# Patient Record
Sex: MALE | Race: WHITE | URBAN - METROPOLITAN AREA
[De-identification: names, ages, dates, MRNs, and addresses within clinical notes are randomized per-mention and may not be internally consistent; named-entity substitution may affect disease eponyms.]

---

## 2023-10-23 LAB — AMB EXT PSA SCREEN: 1.94 NG/ML

## 2024-06-24 ENCOUNTER — OFFICE VISIT (OUTPATIENT)
Dept: INTERNAL MEDICINE CLINIC | Facility: CLINIC | Age: 69
End: 2024-06-24

## 2024-06-24 VITALS
OXYGEN SATURATION: 97 % | HEART RATE: 66 BPM | TEMPERATURE: 98 F | SYSTOLIC BLOOD PRESSURE: 118 MMHG | HEIGHT: 68.31 IN | DIASTOLIC BLOOD PRESSURE: 64 MMHG | WEIGHT: 202 LBS | BODY MASS INDEX: 30.26 KG/M2

## 2024-06-24 DIAGNOSIS — R00.1 BRADYCARDIA: ICD-10-CM

## 2024-06-24 DIAGNOSIS — I25.10 CORONARY ATHEROSCLEROSIS DUE TO CALCIFIED CORONARY LESION: ICD-10-CM

## 2024-06-24 DIAGNOSIS — C82.93 FOLLICULAR LYMPHOMA OF INTRA-ABDOMINAL LYMPH NODES, UNSPECIFIED FOLLICULAR LYMPHOMA TYPE (HCC): ICD-10-CM

## 2024-06-24 DIAGNOSIS — R00.2 PALPITATIONS: Primary | ICD-10-CM

## 2024-06-24 DIAGNOSIS — I25.84 CORONARY ATHEROSCLEROSIS DUE TO CALCIFIED CORONARY LESION: ICD-10-CM

## 2024-06-24 PROCEDURE — 3008F BODY MASS INDEX DOCD: CPT | Performed by: INTERNAL MEDICINE

## 2024-06-24 PROCEDURE — 1160F RVW MEDS BY RX/DR IN RCRD: CPT | Performed by: INTERNAL MEDICINE

## 2024-06-24 PROCEDURE — 3078F DIAST BP <80 MM HG: CPT | Performed by: INTERNAL MEDICINE

## 2024-06-24 PROCEDURE — 1159F MED LIST DOCD IN RCRD: CPT | Performed by: INTERNAL MEDICINE

## 2024-06-24 PROCEDURE — 3074F SYST BP LT 130 MM HG: CPT | Performed by: INTERNAL MEDICINE

## 2024-06-24 PROCEDURE — 99204 OFFICE O/P NEW MOD 45 MIN: CPT | Performed by: INTERNAL MEDICINE

## 2024-06-24 RX ORDER — MULTIVIT-MIN/IRON/FOLIC ACID/K 18-600-40
CAPSULE ORAL
COMMUNITY

## 2024-06-24 RX ORDER — ATENOLOL 25 MG/1
25 TABLET ORAL DAILY
COMMUNITY

## 2024-06-24 RX ORDER — ASPIRIN 81 MG/1
TABLET, CHEWABLE ORAL DAILY
COMMUNITY

## 2024-06-24 RX ORDER — CHLORAL HYDRATE 500 MG
1000 CAPSULE ORAL DAILY
COMMUNITY

## 2024-06-24 NOTE — PROGRESS NOTES
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  Follows with Dr. Johnson at [facility] is adenopathy is intra-abdominal.  No current symptoms.    He sees Dr. Mcghee cardiology at [facility].  He has been treated for palpitations with atenolol.  He has had stress test in the past currently no symptoms I discussed last CAT scan which showed calcification of his coronary arteries.      Muscle twitching in abd is concerning him.  He is wondering if it could be an aneurysm.  Recent CAT scan showed no evidence of that.  He has no other fasciculation like symptoms.    Sees  DR johnson at  --  [facility] at  [facility]   Patient Active Problem List   Diagnosis    Follicular lymphoma (HCC)    Palpitations    Coronary atherosclerosis due to calcified coronary lesion          Current Outpatient Medications on File Prior to Visit   Medication Sig Dispense Refill    atenolol 25 MG Oral Tab Take 1 tablet (25 mg total) by mouth daily.      Magnesium Cl-Calcium Carbonate 71.5-119 MG Oral Tab EC Take 1 tablet by mouth daily.      aspirin 81 MG Oral Chew Tab Chew by mouth daily.      omega-3 fatty acids 1000 MG Oral Cap Take 1,000 mg by mouth daily.      Cholecalciferol (VITAMIN D) 50 MCG (2000 UT) Oral Cap Take by mouth.      Coenzyme Q10 (Q-10 CO-ENZYME OR) Take by mouth.       No current facility-administered medications on file prior to visit.          Vitals:    06/24/24 1506   BP: 118/64   Pulse: 66   Temp: 98.2 °F (36.8 °C)   VITALSBody mass index is 30.44 kg/m².    Pertinent findings on the physical exam; quivering  of  abd  muscle  wall-he appears very healthy neurological exam normal heart is regular -   did not appreciate significant ectopy.  Lungs are clear.  No hepatosplenomegaly.   Extremities unremarkable no external adenopathy noted.    Duane was seen today for establish care.    Diagnoses and all orders for this visit:    Palpitations    Coronary atherosclerosis due to calcified coronary lesion    Bradycardia    Follicular lymphoma of intra-abdominal lymph nodes, unspecified follicular lymphoma type (HCC)       Currently has palpitations is on atenolol for that.  Has been following with cardiology.  I told him my concern is for his coronary artery calcification would recommend to do a heart scan.  Currently is not on a statin.  I think this test would help define risk.    History of bradycardia currently asymptomatic is on atenolol.    He follows with hematology for his follicular lymphoma currently no significant symptoms.  Recent sick CAT scan was reviewed.  Does have adenopathy also had lymphoma noted on last colonoscopy.              This note was prepared using Dragon Medical voice recognition dictation software and as a result, errors may occur. When identified, these errors have been corrected. While every attempt is made to correct errors during dictation, discrepancies may still exist

## 2024-06-24 NOTE — PROGRESS NOTES
COLONOSCOPY        Narrative    Patient Name: Duane Bob  Procedure Date: 9/22/2022 1:19 PM  MRN: [id number]  Account Number: [id number]  YOB: 1955  Age: 67  Gender: Male  Procedure:             Colonoscopy  Indications:           Screening for colorectal malignant neoplasm  Comorbidities          See the other procedure note for documentation of                         comorbidities  Providers:             Isidro Langston MD, Leah A (Nurse)  Referring MD:          Isidro Langston MD  Medicines:             Midazolam 6 mg IV, Fentanyl 100 micrograms IV  Complication:          No immediate complications.  Procedure:             After the risks (including, but not limited to,                         medication reaction, infection, bleeding, perforation,                         missed lesions), benefits and alternatives of the                         procedure were discussed with the patient and all                         questions were answered, informed consent was obtained.                         The scope was passed under direct vision. Throughout                         the procedure, the patient's blood pressure, pulse, and                         oxygen saturations were monitored continuously. The                         colonoscope was introduced through the anus and                         advanced to the cecum, identified by appendiceal                         orifice and ileocecal valve. The colonoscopy was                         performed without difficulty. The quality of the bowel                         preparation was evaluated using the BBPS (Morse Bowel                         Preparation Scale) with scores of: Right Colon = 3,                         Transverse Colon = 3 and Left Colon = 3 (entire mucosa                         seen well with no residual staining, small fragments of                         stool or opaque liquid). The total BBPS score equals  9.  Findings:              The perianal and digital rectal examinations were                         normal.                         Three sessile polyps were found in the transverse                         colon. The polyps were 6 to 9 mm in size. These polyps                         were removed with a cold snare. Resection and retrieval                         were complete.                         The exam was otherwise without abnormality on direct                         and retroflexion views.  Impression:            - Three 6 to 9 mm polyps in the transverse colon,                         removed with a cold snare. Resected and retrieved.                         - The examination was otherwise normal on direct and                         retroflexion views.  Recommendation:        - Await pathology results.                         - Repeat colonoscopy in 3 years for surveillance.  Estimated Blood Loss:  Estimated blood loss: none.  Attending Participation:       I personally performed the entire procedure.  Isidro Langston MD  9/22/2022 3:03:53 PM  Number of Addenda: 0  Note Initiated On: 9/22/2022 1:19 PM  Procedure Note    Isidro Langston MD - 09/22/2022  Formatting of this note might be different from the original.  Patient Name: Duane Bob  Procedure Date: 9/22/2022 1:19 PM  MRN: [id number]  Account Number: [id number]  YOB: 1955  Age: 67  Gender: Male  Procedure:             Colonoscopy  Indications:           Screening for colorectal malignant neoplasm  Comorbidities          See the other procedure note for documentation of                         comorbidities  Providers:             Isidro Langston MD, Leah A (Nurse)  Referring MD:          Isidro Langston MD  Medicines:             Midazolam 6 mg IV, Fentanyl 100 micrograms IV  Complication:          No immediate complications.  Procedure:             After the risks (including, but not limited to,                          medication reaction, infection, bleeding, perforation,                         missed lesions), benefits and alternatives of the                         procedure were discussed with the patient and all                         questions were answered, informed consent was obtained.                         The scope was passed under direct vision. Throughout                         the procedure, the patient's blood pressure, pulse, and                         oxygen saturations were monitored continuously. The                         colonoscope was introduced through the anus and                         advanced to the cecum, identified by appendiceal                         orifice and ileocecal valve. The colonoscopy was                         performed without difficulty. The quality of the bowel                         preparation was evaluated using the BBPS (Bedrock Bowel                         Preparation Scale) with scores of: Right Colon = 3,                         Transverse Colon = 3 and Left Colon = 3 (entire mucosa                         seen well with no residual staining, small fragments of                         stool or opaque liquid). The total BBPS score equals 9.  Findings:              The perianal and digital rectal examinations were                         normal.                         Three sessile polyps were found in the transverse                         colon. The polyps were 6 to 9 mm in size. These polyps                         were removed with a cold snare. Resection and retrieval                         were complete.                         The exam was otherwise without abnormality on direct                         and retroflexion views.  Impression:            - Three 6 to 9 mm polyps in the transverse colon,                         removed with a cold snare. Resected and retrieved.                         - The examination was otherwise normal on direct and                          retroflexion views.  Recommendation:        - Await pathology results.                         - Repeat colonoscopy in 3 years for surveillance.  Estimated Blood Loss:  Estimated blood loss: none.  Attending Participation:       I personally performed the entire procedure.  Isidro Langston MD  9/22/2022 3:03:53 PM  Number of Addenda: 0  Note Initiated On: 9/22/2022 1:19 PM  Exam End: 09/22/22  2:24 PM    Specimen Collected: 09/22/22  1:19 PM Last Resulted: 09/22/22  3:04 PM   Received From: [facility]  Result Received: 06/24/24  3:33 PM

## 2024-06-25 ENCOUNTER — ORDER TRANSCRIPTION (OUTPATIENT)
Dept: ADMINISTRATIVE | Facility: HOSPITAL | Age: 69
End: 2024-06-25

## 2024-06-25 DIAGNOSIS — Z13.6 SCREENING FOR CARDIOVASCULAR CONDITION: Primary | ICD-10-CM

## 2024-07-09 ENCOUNTER — HOSPITAL ENCOUNTER (OUTPATIENT)
Dept: CT IMAGING | Age: 69
Discharge: HOME | End: 2024-07-09
Attending: INTERNAL MEDICINE

## 2024-07-09 DIAGNOSIS — Z13.9 ENCOUNTER FOR SCREENING: ICD-10-CM

## 2024-07-09 DIAGNOSIS — Z13.6 SCREENING FOR CARDIOVASCULAR CONDITION: ICD-10-CM

## 2024-08-26 ENCOUNTER — OFFICE VISIT (OUTPATIENT)
Dept: INTERNAL MEDICINE CLINIC | Facility: CLINIC | Age: 69
End: 2024-08-26
Payer: MEDICARE

## 2024-08-26 VITALS
OXYGEN SATURATION: 98 % | HEART RATE: 69 BPM | DIASTOLIC BLOOD PRESSURE: 70 MMHG | WEIGHT: 205 LBS | SYSTOLIC BLOOD PRESSURE: 128 MMHG | HEIGHT: 68.31 IN | BODY MASS INDEX: 30.71 KG/M2

## 2024-08-26 DIAGNOSIS — G47.33 OBSTRUCTIVE SLEEP APNEA SYNDROME: ICD-10-CM

## 2024-08-26 DIAGNOSIS — R00.2 PALPITATIONS: ICD-10-CM

## 2024-08-26 DIAGNOSIS — E78.5 DYSLIPIDEMIA (HIGH LDL; LOW HDL): ICD-10-CM

## 2024-08-26 DIAGNOSIS — M79.644 FINGER PAIN, RIGHT: ICD-10-CM

## 2024-08-26 DIAGNOSIS — R93.1 HIGH CORONARY ARTERY CALCIUM SCORE: Primary | ICD-10-CM

## 2024-08-26 DIAGNOSIS — R91.1 LUNG NODULE SEEN ON IMAGING STUDY: ICD-10-CM

## 2024-08-26 PROCEDURE — 3008F BODY MASS INDEX DOCD: CPT | Performed by: INTERNAL MEDICINE

## 2024-08-26 PROCEDURE — G2211 COMPLEX E/M VISIT ADD ON: HCPCS | Performed by: INTERNAL MEDICINE

## 2024-08-26 PROCEDURE — 99214 OFFICE O/P EST MOD 30 MIN: CPT | Performed by: INTERNAL MEDICINE

## 2024-08-26 PROCEDURE — 3078F DIAST BP <80 MM HG: CPT | Performed by: INTERNAL MEDICINE

## 2024-08-26 PROCEDURE — 1159F MED LIST DOCD IN RCRD: CPT | Performed by: INTERNAL MEDICINE

## 2024-08-26 PROCEDURE — 3074F SYST BP LT 130 MM HG: CPT | Performed by: INTERNAL MEDICINE

## 2024-08-26 PROCEDURE — 1160F RVW MEDS BY RX/DR IN RCRD: CPT | Performed by: INTERNAL MEDICINE

## 2024-08-26 RX ORDER — MULTIVITAMIN
CAPSULE ORAL AS DIRECTED
COMMUNITY

## 2024-08-26 RX ORDER — ROSUVASTATIN CALCIUM 20 MG/1
20 TABLET, COATED ORAL NIGHTLY
Qty: 90 TABLET | Refills: 2 | Status: SHIPPED | OUTPATIENT
Start: 2024-08-26

## 2024-08-26 NOTE — PROGRESS NOTES
Duane Bob male 69 year old         Chief Complaint   Patient presents with    Test Results     Discussed  high ca score  - antonella LAD - not on statin  - has done  stress test in past  -  sees  Cards  at [facility] -        Overread  6mm nodule -  he showed it to Dr Mata  -  onc - was told  stable  has had lesion before    10 ml  and  6 ml  previous  14 ml      Hurt back  one month ago  -getting  better      Right  finger  pain  for  4 mos   not getting  better       Not  sleeping  well has  ALEIDA  -  doesn't  use  mask -  now congested        Patient Active Problem List   Diagnosis    Follicular lymphoma (HCC)    Palpitations    Coronary atherosclerosis due to calcified coronary lesion          Current Outpatient Medications on File Prior to Visit   Medication Sig Dispense Refill    Multiple Vitamin (MULTIVITAMINS) Oral Cap Take by mouth As Directed.      atenolol 25 MG Oral Tab Take 1 tablet (25 mg total) by mouth daily.      Magnesium Cl-Calcium Carbonate 71.5-119 MG Oral Tab EC Take 1 tablet by mouth daily.      aspirin 81 MG Oral Chew Tab Chew by mouth daily.      omega-3 fatty acids 1000 MG Oral Cap Take 1,000 mg by mouth daily.      Cholecalciferol (VITAMIN D) 50 MCG (2000 UT) Oral Cap Take by mouth.      Coenzyme Q10 (Q-10 CO-ENZYME OR) Take by mouth.       No current facility-administered medications on file prior to visit.          Vitals:    08/26/24 1450   BP: 128/70   Pulse: 69   VITALSBody mass index is 30.89 kg/m².    Pertinent findings on the physical exam; cor reg  no  bruits  no adenopathy  - moviong  slowly  for back pain     Duane was seen today for test results.    Diagnoses and all orders for this visit:    High coronary artery calcium score  -     Comp Metabolic Panel (14); Future  -     Lipid Panel; Future    Palpitations    Lung nodule seen on imaging study    Dyslipidemia (high LDL; low HDL)  -     Comp Metabolic Panel (14); Future  -     Lipid Panel; Future    Obstructive sleep  apnea syndrome  -     ENT - INTERNAL; Future    Finger pain, right  -     PLASTIC SURGERY - INTERNAL    Other orders  -     rosuvastatin 20 MG Oral Tab; Take 1 tablet (20 mg total) by mouth nightly.       Recommend  statin  for  high  sonny score -I put an order for Crestor 20 mg discussed benefits versus risk.- discussed  stress testing  -  currently   no  sx   is  active  -he is going to message his cardiologist with my recommendations.-After being on medicines for 1 month recommend to get blood to make sure     Palp are stable -PPM      Had previous CT in past  current nodules  stable        Refer to hand  doc -   for his left fingers still not better after his accident.      Flonase trial recommended for congestion at nighttime.        This note was prepared using Dragon Medical voice recognition dictation software and as a result, errors may occur. When identified, these errors have been corrected. While every attempt is made to correct errors during dictation, discrepancies may still exist

## 2024-08-27 ENCOUNTER — PATIENT MESSAGE (OUTPATIENT)
Dept: INTERNAL MEDICINE CLINIC | Facility: CLINIC | Age: 69
End: 2024-08-27

## 2024-08-27 NOTE — TELEPHONE ENCOUNTER
From: Duane Bob  To: Tien Esquivel  Sent: 8/27/2024 11:51 AM CDT  Subject: Statin    Hello Dr. Esquivel,    I did reach out to Dr. Mcghee on the statin. Can it be reduced to a 10 mg dose?  Last ldl was 110.   Best,  Duane Bob

## 2024-09-26 ENCOUNTER — HOSPITAL ENCOUNTER (OUTPATIENT)
Dept: GENERAL RADIOLOGY | Facility: HOSPITAL | Age: 69
Discharge: HOME | End: 2024-09-26
Attending: PLASTIC SURGERY
Payer: MEDICARE

## 2024-09-26 ENCOUNTER — OFFICE VISIT (OUTPATIENT)
Dept: SURGERY | Facility: CLINIC | Age: 69
End: 2024-09-26

## 2024-09-26 DIAGNOSIS — M19.041 PRIMARY OSTEOARTHRITIS OF RIGHT HAND: ICD-10-CM

## 2024-09-26 DIAGNOSIS — M79.641 PAIN IN RIGHT HAND: ICD-10-CM

## 2024-09-26 DIAGNOSIS — R52 PAIN: Primary | ICD-10-CM

## 2024-09-26 DIAGNOSIS — R52 PAIN: ICD-10-CM

## 2024-09-26 PROCEDURE — 73130 X-RAY EXAM OF HAND: CPT | Performed by: PLASTIC SURGERY

## 2024-09-26 PROCEDURE — 1159F MED LIST DOCD IN RCRD: CPT | Performed by: PLASTIC SURGERY

## 2024-09-26 PROCEDURE — 1125F AMNT PAIN NOTED PAIN PRSNT: CPT | Performed by: PLASTIC SURGERY

## 2024-09-26 PROCEDURE — 99204 OFFICE O/P NEW MOD 45 MIN: CPT | Performed by: PLASTIC SURGERY

## 2024-09-26 NOTE — H&P
Injury 1: RH sprain,fall.  - Date: 04/29/24  - Days Since: 150     Duane Bob is a 69 year old male that presents with   Chief Complaint   Patient presents with    Injury     RH   .    REFERRED BY:  Tien Esquivel    Pacemaker: No  Latex Allergy: no  Coumadin: No  Plavix: No  Other anticoagulants: No  Diet medication: No  Cardiac stents: No    HAND DOMINANCE: Right    Profession: retired    RECONSTRUCTIVE HISTORY    SUN EXPOSURE  Current no  Past yes  Sunburns yes  Tanning salons current no  Tanning salons past no     SKIN CANCER    Personal history of skin cancer: basal cell carcinoma, squamous cell carcinoma       HPI:       Injury 1: Right hand fall, pain, seen 150 days postinjury  - Date: 04/29/24  - Days Since: 150      77-year-old male right-hand-dominant with pain in the right hand    He fell with his digits flexed.  He was seen elsewhere.  No fracture.    Pain in all digits  Stiffness of the hand          Review of Systems:   Constitutional: No change in appetite, chill/rigors, or fatigue  GI: No jaundice  Endocrine: No generalized weakness  Neurological: No aphasia, loss of consciousness, or seizures    Musculoskeletal:      Date of injury 4/29/24     Location right      thumb  index finger  middle finger  ring finger  small finger      Mechanism Fall on right hand     Treatment Xray done at different hospital does not have disc. States xray was negative.                                      Splint worn initially.       Pain  yes intermittent in all fingers,aching. Rates pain 3/10. Not taking analgesics.     Numbness None  Can't make a tight fist with fingers.  Difficulty grabbing things.  RRF PIP edema  Stiffness  Per Dr York xray ordered.        PMH:     MEDICAL  Past Medical History:    Follicular lymphoma (HCC)        SURGICAL  Past Surgical History:   Procedure Laterality Date    Appendectomy      Colonoscopy      Laminectomy      Tonsillectomy          ALLERIGIES  No Known Allergies      MEDICATIONS  Current Outpatient Medications   Medication Sig Dispense Refill    rosuvastatin 10 MG Oral Tab Take 1 tablet (10 mg total) by mouth nightly. 90 tablet 3    Multiple Vitamin (MULTIVITAMINS) Oral Cap Take by mouth As Directed.      atenolol 25 MG Oral Tab Take 1 tablet (25 mg total) by mouth daily.      Magnesium Cl-Calcium Carbonate 71.5-119 MG Oral Tab EC Take 1 tablet by mouth daily.      aspirin 81 MG Oral Chew Tab Chew by mouth daily.      omega-3 fatty acids 1000 MG Oral Cap Take 1,000 mg by mouth daily.      Cholecalciferol (VITAMIN D) 50 MCG (2000 UT) Oral Cap Take by mouth.      Coenzyme Q10 (Q-10 CO-ENZYME OR) Take by mouth.          SOCIAL HISTORY  Social History     Socioeconomic History    Marital status:    Tobacco Use    Smoking status: Never     Passive exposure: Never    Smokeless tobacco: Never   Vaping Use    Vaping status: Never Used   Substance and Sexual Activity    Alcohol use: Not Currently    Drug use: Never        FAMILY HISTORY  History reviewed. No pertinent family history.       PHYSICAL EXAM:     CONSTITUTIONAL: Overall appearance - Normal  HEENT: Normocephalic  EYES: Conjunctiva - Right: Normal, Left: Normal; EOMI  EARS: Inspection - Right: Normal, Left: Normal  NECK/THYROID: Inspection - Normal, Palpation - Normal, Thyroid gland - Normal, No adenopathy  RESPIRATORY: Inspection - Normal, Effort - Normal  CARDIOVASCULAR: Regular rhythm, No murmurs  ABDOMEN: Inspection - Normal, No abdominal tenderness  NEURO: Memory intact  PSYCH: Oriented to person, place, time, and situation, Appropriate mood and affect      Hand Physical Exam:     Wrist and digits full range of motion  RRF PIP -20  RIF/R MF MP tenderness    Thumb CMC:  Dorsal subluxation  No dorsal tenderness   volar tenderness  Crepitation positive  Grind positive  Traction pressure positive    X-ray independently interpreted: Diffuse MP and IP hand arthritis  Near destruction of MPs RIF/RMF    ASSESSMENT/PLAN:      Arthritis diffuse right hand  Right thumb CMC    We discussed what arthritis is, including treatment options.  Arthritis is not curable.  Treatment is designed to try to improve symptoms and function, but this is not always possible.  Arthritis may require multiple treatments over a long period of time, and symptoms may not go away completely. Surgery is sometimes necessary.  Questions were answered and the patient wishes to proceed with treatment.    OT for range of motion, strengthening, modalities, including paraffin    If symptoms are not relieved, I would recommend the patient see physiatry.            9/26/2024  Benson York MD      +++++++++++++++++++++++++++++++++++++++++      MEDICAL DECISION MAKING    PROBLEMS      MODERATE    (number / complexity)          Acute complicated injury with arthritis exacerbation    DATA         MODERATE    (amount / complexity)          X-rays independently reviewed    MANAGEMENT RISK  LOW    (complications/ morbidity)       Splint/OT                  MDM LEVEL    MODERATE

## 2024-10-03 ENCOUNTER — APPOINTMENT (OUTPATIENT)
Dept: SURGERY | Facility: CLINIC | Age: 69
End: 2024-10-03

## 2024-10-03 DIAGNOSIS — M79.601 PAIN OF RIGHT UPPER EXTREMITY: Primary | ICD-10-CM

## 2024-10-03 PROCEDURE — 97022 WHIRLPOOL THERAPY: CPT | Performed by: OCCUPATIONAL THERAPIST

## 2024-10-03 PROCEDURE — 1159F MED LIST DOCD IN RCRD: CPT | Performed by: OCCUPATIONAL THERAPIST

## 2024-10-03 PROCEDURE — 97165 OT EVAL LOW COMPLEX 30 MIN: CPT | Performed by: OCCUPATIONAL THERAPIST

## 2024-10-07 NOTE — PROGRESS NOTES
OCCUPATIONAL THERAPY EVALUATION:   Duane Bob   [id number]       SUBJECTIVE:    HX of Injury: Right hand pain: Seen x 150 days post injury.  Chief Complaint:  Right hand pain.    Precautions: None  Premorbid Functional Status: Independent w/ driving / sitting, Independent w/ ADL's  Current Level of Function: As noted above.  Employment: Retired  Hand Dominance: right  Living Situation:  Not addressed  Barriers to Learning: None  Patient Goals: Full use of the right hand.    Imaging/Tests: X-ray        OBJECTIVE DATA:   PAIN:   Rating (1/10): 1/10 at rest, 3/10 with activity    Location: RRF        SENSORY:  Intact          STRENGTH: (lbs) Right Average Left Average   : 85 # 80 #   2 pt Pinch:     3 pt Pinch:     Lateral Pinch:         ASSESSMENT & PLAN OF CARE:    Treatment Provided: Patient was seen for an initial evaluation: Fluidotherapy to the right wrist and hand: To increase active range of motion, reduce joint stiffness, as well as decrease scar sensitivity, for increased functional use of the involved extremity, during self care, work and or leisure time tasks.:  HEP:  AROM, Tendon glides, x 20 reps per set, x 5 sets daily. Reviewed hand elevation importance. Written handout was provided to reinforce today's treatment and educational session.       Rehabilitation Potential: Good    CLINICAL ASSESSMENT:    Patient/Caregiver Education Provided: Yes    Treatment Plan:  Therapeutic Exercise  Therapeutic Activities  Modalities  Patient/Family Education    GOALS:  Short term goals to be reached in x 1 week:    1) Independent with HEP..    Long term goals to be reached in x 1 month:    1) Full functional use of the involved extremity for self-care, leisure and work related tasks:.        Patient will be seen 2 x /week for 3 weeks or a total of 6 visits.   Pt. was advised regarding the findings of this evaluation and agrees to the plan of care.     Elia VERAS, OTRL

## 2024-11-14 ENCOUNTER — LAB ENCOUNTER (OUTPATIENT)
Dept: LAB | Facility: HOSPITAL | Age: 69
End: 2024-11-14
Attending: INTERNAL MEDICINE
Payer: MEDICARE

## 2024-11-14 ENCOUNTER — PATIENT MESSAGE (OUTPATIENT)
Dept: INTERNAL MEDICINE CLINIC | Facility: CLINIC | Age: 69
End: 2024-11-14

## 2024-11-14 DIAGNOSIS — E78.5 DYSLIPIDEMIA (HIGH LDL; LOW HDL): ICD-10-CM

## 2024-11-14 DIAGNOSIS — R93.1 HIGH CORONARY ARTERY CALCIUM SCORE: ICD-10-CM

## 2024-11-14 LAB
ALBUMIN SERPL-MCNC: 4.4 G/DL (ref 3.2–4.8)
ALBUMIN/GLOB SERPL: 1.8 {RATIO} (ref 1–2)
ALP LIVER SERPL-CCNC: 68 U/L
ALT SERPL-CCNC: 44 U/L
ANION GAP SERPL CALC-SCNC: 5 MMOL/L (ref 0–18)
AST SERPL-CCNC: 31 U/L (ref ?–34)
BILIRUB SERPL-MCNC: 0.8 MG/DL (ref 0.2–1.1)
BUN BLD-MCNC: 12 MG/DL (ref 9–23)
BUN/CREAT SERPL: 11.7 (ref 10–20)
CALCIUM BLD-MCNC: 10.2 MG/DL (ref 8.7–10.4)
CHLORIDE SERPL-SCNC: 107 MMOL/L (ref 98–112)
CHOLEST SERPL-MCNC: 118 MG/DL (ref ?–200)
CO2 SERPL-SCNC: 31 MMOL/L (ref 21–32)
CREAT BLD-MCNC: 1.03 MG/DL
EGFRCR SERPLBLD CKD-EPI 2021: 78 ML/MIN/1.73M2 (ref 60–?)
FASTING PATIENT LIPID ANSWER: YES
FASTING STATUS PATIENT QL REPORTED: YES
GLOBULIN PLAS-MCNC: 2.5 G/DL (ref 2–3.5)
GLUCOSE BLD-MCNC: 87 MG/DL (ref 70–99)
HDLC SERPL-MCNC: 38 MG/DL (ref 40–59)
LDLC SERPL CALC-MCNC: 55 MG/DL (ref ?–100)
NONHDLC SERPL-MCNC: 80 MG/DL (ref ?–130)
OSMOLALITY SERPL CALC.SUM OF ELEC: 295 MOSM/KG (ref 275–295)
POTASSIUM SERPL-SCNC: 4.2 MMOL/L (ref 3.5–5.1)
PROT SERPL-MCNC: 6.9 G/DL (ref 5.7–8.2)
SODIUM SERPL-SCNC: 143 MMOL/L (ref 136–145)
TRIGL SERPL-MCNC: 143 MG/DL (ref 30–149)
VLDLC SERPL CALC-MCNC: 21 MG/DL (ref 0–30)

## 2024-11-14 PROCEDURE — 36415 COLL VENOUS BLD VENIPUNCTURE: CPT

## 2024-11-14 PROCEDURE — 80053 COMPREHEN METABOLIC PANEL: CPT

## 2024-11-14 PROCEDURE — 80061 LIPID PANEL: CPT

## 2025-07-28 ENCOUNTER — TELEPHONE (OUTPATIENT)
Dept: INTERNAL MEDICINE CLINIC | Facility: CLINIC | Age: 70
End: 2025-07-28

## 2025-07-30 RX ORDER — ROSUVASTATIN CALCIUM 10 MG/1
10 TABLET, COATED ORAL NIGHTLY
Qty: 90 TABLET | Refills: 0 | Status: SHIPPED | OUTPATIENT
Start: 2025-07-30

## 2025-08-19 ENCOUNTER — TELEPHONE (OUTPATIENT)
Age: 70
End: 2025-08-19

## 2025-08-21 ENCOUNTER — OFFICE VISIT (OUTPATIENT)
Age: 70
End: 2025-08-21

## 2025-08-21 VITALS
HEART RATE: 67 BPM | WEIGHT: 203 LBS | SYSTOLIC BLOOD PRESSURE: 132 MMHG | OXYGEN SATURATION: 98 % | BODY MASS INDEX: 30.41 KG/M2 | DIASTOLIC BLOOD PRESSURE: 78 MMHG | RESPIRATION RATE: 16 BRPM | HEIGHT: 68.31 IN

## 2025-08-21 DIAGNOSIS — G89.29 CHRONIC PAIN OF RIGHT KNEE: ICD-10-CM

## 2025-08-21 DIAGNOSIS — M20.42 ACQUIRED BILATERAL HAMMER TOES: ICD-10-CM

## 2025-08-21 DIAGNOSIS — K62.5 BRBPR (BRIGHT RED BLOOD PER RECTUM): ICD-10-CM

## 2025-08-21 DIAGNOSIS — M25.561 CHRONIC PAIN OF RIGHT KNEE: ICD-10-CM

## 2025-08-21 DIAGNOSIS — R93.1 HIGH CORONARY ARTERY CALCIUM SCORE: ICD-10-CM

## 2025-08-21 DIAGNOSIS — C82.93 FOLLICULAR LYMPHOMA OF INTRA-ABDOMINAL LYMPH NODES, UNSPECIFIED FOLLICULAR LYMPHOMA TYPE (HCC): ICD-10-CM

## 2025-08-21 DIAGNOSIS — L57.0 ACTINIC KERATOSIS: ICD-10-CM

## 2025-08-21 DIAGNOSIS — M25.361 KNEE INSTABILITY, RIGHT: ICD-10-CM

## 2025-08-21 DIAGNOSIS — M20.41 ACQUIRED BILATERAL HAMMER TOES: ICD-10-CM

## 2025-08-21 DIAGNOSIS — R09.81 SINUS CONGESTION: ICD-10-CM

## 2025-08-21 DIAGNOSIS — R42 VERTIGO: Primary | ICD-10-CM

## 2025-08-21 PROCEDURE — 3008F BODY MASS INDEX DOCD: CPT | Performed by: INTERNAL MEDICINE

## 2025-08-21 PROCEDURE — 3075F SYST BP GE 130 - 139MM HG: CPT | Performed by: INTERNAL MEDICINE

## 2025-08-21 PROCEDURE — G2211 COMPLEX E/M VISIT ADD ON: HCPCS | Performed by: INTERNAL MEDICINE

## 2025-08-21 PROCEDURE — 99215 OFFICE O/P EST HI 40 MIN: CPT | Performed by: INTERNAL MEDICINE

## 2025-08-21 PROCEDURE — 3078F DIAST BP <80 MM HG: CPT | Performed by: INTERNAL MEDICINE

## 2025-08-21 PROCEDURE — 1159F MED LIST DOCD IN RCRD: CPT | Performed by: INTERNAL MEDICINE

## 2025-08-21 PROCEDURE — 1126F AMNT PAIN NOTED NONE PRSNT: CPT | Performed by: INTERNAL MEDICINE

## 2025-08-21 PROCEDURE — 1160F RVW MEDS BY RX/DR IN RCRD: CPT | Performed by: INTERNAL MEDICINE

## (undated) NOTE — LETTER
24      Patient: Duane Bob  : 1955 Visit date: 2024    Dear Thaddeus,      I examined your patient in consultation today.    He has diffuse right hand arthritis and severe right thumb trapeziometacarpal arthritis, aggravated by a fall several months ago.    We started him in therapy including range of motion and strengthening, as well as modalities, including paraffin.    Thank you for your kind referral. If I may answer any questions, please feel free to contact me.     Sincerely,   Benson York MD     CC:   No Recipients